# Patient Record
Sex: FEMALE | Race: BLACK OR AFRICAN AMERICAN | NOT HISPANIC OR LATINO | Employment: UNEMPLOYED | URBAN - METROPOLITAN AREA
[De-identification: names, ages, dates, MRNs, and addresses within clinical notes are randomized per-mention and may not be internally consistent; named-entity substitution may affect disease eponyms.]

---

## 2017-08-30 ENCOUNTER — CLINICAL SUPPORT (OUTPATIENT)
Dept: OCCUPATIONAL MEDICINE | Facility: CLINIC | Age: 32
End: 2017-08-30

## 2017-08-30 DIAGNOSIS — Z02.83 ENCOUNTER FOR DRUG SCREENING: Primary | ICD-10-CM

## 2017-08-30 LAB
CTP QC/QA: YES
POC 10 PANEL DRUG SCREEN: NEGATIVE

## 2017-08-30 PROCEDURE — 80305 DRUG TEST PRSMV DIR OPT OBS: CPT | Mod: QW,S$GLB,, | Performed by: EMERGENCY MEDICINE

## 2018-01-03 ENCOUNTER — HOSPITAL ENCOUNTER (EMERGENCY)
Facility: HOSPITAL | Age: 33
Discharge: HOME | End: 2018-01-03
Attending: EMERGENCY MEDICINE

## 2018-01-03 VITALS
RESPIRATION RATE: 16 BRPM | SYSTOLIC BLOOD PRESSURE: 110 MMHG | TEMPERATURE: 98 F | WEIGHT: 151 LBS | HEART RATE: 72 BPM | HEIGHT: 62 IN | OXYGEN SATURATION: 99 % | DIASTOLIC BLOOD PRESSURE: 67 MMHG | BODY MASS INDEX: 27.79 KG/M2

## 2018-01-03 DIAGNOSIS — Z34.90 EARLY STAGE OF PREGNANCY: Primary | ICD-10-CM

## 2018-01-03 PROCEDURE — 99283 EMERGENCY DEPT VISIT LOW MDM: CPT

## 2018-01-03 NOTE — ED TRIAGE NOTES
Patient states she is here for pregnancy test, states she is concerned about ectopic pregnancy. No abdominal pain spotting on Jan 1 . LMP 12/2

## 2018-01-03 NOTE — ED PROVIDER NOTES
"Encounter Date: 1/3/2018    SCRIBE #1 NOTE: I, Ronit Neal, am scribing for, and in the presence of,  Dr. Chino. I have scribed the entire note.       History     Chief Complaint   Patient presents with    Possible Pregnancy     Pt states she had a (+) UPT today at home.  States "I have been told that my tubes are blocked though and want to check if its in the tubes".      Time patient was seen by the provider: 1:51 PM      The patient is a 32 y.o. female with no PM hx who presents to the ED to be evaluated for a possible pregnancy. Pt had a positive UPT at home today; she thinks she may be approximately one month pregnant, however a recent HSG revealed a blockage in her fallopian tubes. She was told that she cannot get pregnant because of this and is concerned for ectopic/tubal pregnancy. LNMP was about a month ago. She endorses nausea, but denies abdominal pain. Pt has an appointment with her OB January 10th.         The history is provided by the patient and medical records.     Review of patient's allergies indicates:   Allergen Reactions    Iodine and iodide containing products     Latex, natural rubber      History reviewed. No pertinent past medical history.  History reviewed. No pertinent surgical history.  History reviewed. No pertinent family history.  Social History   Substance Use Topics    Smoking status: Never Smoker    Smokeless tobacco: Never Used    Alcohol use No     Review of Systems   Constitutional: Negative for fever.   HENT: Negative for sore throat.    Eyes: Negative for visual disturbance.   Respiratory: Negative for shortness of breath.    Cardiovascular: Negative for chest pain.   Gastrointestinal: Positive for nausea. Negative for abdominal pain.   Genitourinary: Negative for dysuria.   Musculoskeletal: Negative for back pain.   Skin: Negative for rash.   Neurological: Negative for weakness.   Hematological: Does not bruise/bleed easily.   Psychiatric/Behavioral: Negative for " suicidal ideas.   All other systems reviewed and are negative.      Physical Exam     Initial Vitals [01/03/18 1254]   BP Pulse Resp Temp SpO2   110/67 72 16 98.1 °F (36.7 °C) 99 %      MAP       81.33         Physical Exam    Nursing note and vitals reviewed.  Constitutional: She appears well-developed. She is not diaphoretic. No distress.   HENT:   Head: Normocephalic and atraumatic.   Mouth/Throat: Oropharynx is clear and moist.   Neck: Normal range of motion. Neck supple. No JVD present.   Cardiovascular: Normal rate, regular rhythm, normal heart sounds and intact distal pulses.   Pulmonary/Chest: Breath sounds normal. No respiratory distress. She has no wheezes. She has no rhonchi. She has no rales.   Abdominal: Soft. She exhibits no distension. There is no tenderness.   Musculoskeletal: Normal range of motion. She exhibits no edema.   Lymphadenopathy:     She has no cervical adenopathy.   Neurological: She is alert and oriented to person, place, and time. No cranial nerve deficit or sensory deficit.   Skin: Skin is warm and dry. Capillary refill takes less than 2 seconds.   Psychiatric: She has a normal mood and affect. Thought content normal.         ED Course   Procedures  Labs Reviewed - No data to display     Urine pregnancy test is Positive.     Medical Decision Making:   History:   Old Medical Records: I decided to obtain old medical records.  Initial Assessment:   Pt has no abd pain however it is too early in the pregnancy to see anything by US . I will send her back to her OB to have an appointment on January 10th.   Differential Diagnosis:   Early pregnancy             Scribe Attestation:   Scribe #1: I performed the above scribed service and the documentation accurately describes the services I performed. I attest to the accuracy of the note.    Attending Attestation:           Physician Attestation for Scribe:      Comments: I, Dr. Chino, personally performed the services described in this  documentation. All medical record entries made by the scribe were at my direction and in my presence.  I have reviewed the chart and agree that the record reflects my personal performance and is accurate and complete.              ED Course      Clinical Impression:   The encounter diagnosis was Early stage of pregnancy.    Disposition:   Disposition: Discharged  Condition: Stable                        Sonia Chino MD  01/03/18 8140

## 2018-01-03 NOTE — ED NOTES
Patient identifiers verified and correct for Ms Christopher  C/C: Pregnancy test  APPEARANCE: awake and alert in NAD.  SKIN: warm, dry and intact. No breakdown or bruising.  MUSCULOSKELETAL: Patient moving all extremities spontaneously, no obvious swelling or deformities noted. Ambulates independently.  RESPIRATORY: Denies shortness of breath.Respirations unlabored.   CARDIAC: Denies CP, 2+ distal pulses; no peripheral edema  ABDOMEN: S/ND/NT, Denies nausea Denies abd pain, positive nausea.   : voids spontaneously, denies difficulty  Neurologic: AAO x 4; follows commands equal strength in all extremities; denies numbness/tingling. Denies dizziness

## 2018-01-03 NOTE — ED NOTES
Discharge home, states understanding to follow up as directed. Ambulates out of ED without difficulty.

## 2019-11-30 ENCOUNTER — HOSPITAL ENCOUNTER (EMERGENCY)
Facility: HOSPITAL | Age: 34
Discharge: HOME | End: 2019-11-30
Attending: EMERGENCY MEDICINE
Payer: MEDICAID

## 2019-11-30 VITALS
BODY MASS INDEX: 28.71 KG/M2 | OXYGEN SATURATION: 100 % | TEMPERATURE: 98 F | WEIGHT: 156 LBS | HEIGHT: 62 IN | RESPIRATION RATE: 16 BRPM | SYSTOLIC BLOOD PRESSURE: 90 MMHG | DIASTOLIC BLOOD PRESSURE: 50 MMHG | HEART RATE: 77 BPM

## 2019-11-30 DIAGNOSIS — I95.1 ORTHOSTATIC HYPOTENSION: ICD-10-CM

## 2019-11-30 DIAGNOSIS — E83.51 HYPOCALCEMIA: ICD-10-CM

## 2019-11-30 DIAGNOSIS — R55 SYNCOPE: ICD-10-CM

## 2019-11-30 DIAGNOSIS — R55 SYNCOPE, UNSPECIFIED SYNCOPE TYPE: Primary | ICD-10-CM

## 2019-11-30 LAB
ANION GAP SERPL CALC-SCNC: 6 MMOL/L (ref 8–16)
B-HCG UR QL: NEGATIVE
BASOPHILS # BLD AUTO: 0.03 K/UL (ref 0–0.2)
BASOPHILS NFR BLD: 0.7 % (ref 0–1.9)
BUN SERPL-MCNC: 5 MG/DL (ref 6–20)
CALCIUM SERPL-MCNC: 7.4 MG/DL (ref 8.7–10.5)
CHLORIDE SERPL-SCNC: 110 MMOL/L (ref 95–110)
CO2 SERPL-SCNC: 24 MMOL/L (ref 23–29)
CREAT SERPL-MCNC: 0.6 MG/DL (ref 0.5–1.4)
CTP QC/QA: YES
DIFFERENTIAL METHOD: ABNORMAL
EOSINOPHIL # BLD AUTO: 0.1 K/UL (ref 0–0.5)
EOSINOPHIL NFR BLD: 1.1 % (ref 0–8)
ERYTHROCYTE [DISTWIDTH] IN BLOOD BY AUTOMATED COUNT: 13.2 % (ref 11.5–14.5)
EST. GFR  (AFRICAN AMERICAN): >60 ML/MIN/1.73 M^2
EST. GFR  (NON AFRICAN AMERICAN): >60 ML/MIN/1.73 M^2
GLUCOSE SERPL-MCNC: 100 MG/DL (ref 70–110)
HCT VFR BLD AUTO: 34.5 % (ref 37–48.5)
HGB BLD-MCNC: 10.5 G/DL (ref 12–16)
IMM GRANULOCYTES # BLD AUTO: 0.01 K/UL (ref 0–0.04)
IMM GRANULOCYTES NFR BLD AUTO: 0.2 % (ref 0–0.5)
LYMPHOCYTES # BLD AUTO: 1.2 K/UL (ref 1–4.8)
LYMPHOCYTES NFR BLD: 25.3 % (ref 18–48)
MCH RBC QN AUTO: 26.9 PG (ref 27–31)
MCHC RBC AUTO-ENTMCNC: 30.4 G/DL (ref 32–36)
MCV RBC AUTO: 89 FL (ref 82–98)
MONOCYTES # BLD AUTO: 0.2 K/UL (ref 0.3–1)
MONOCYTES NFR BLD: 4.6 % (ref 4–15)
NEUTROPHILS # BLD AUTO: 3.1 K/UL (ref 1.8–7.7)
NEUTROPHILS NFR BLD: 68.1 % (ref 38–73)
NRBC BLD-RTO: 0 /100 WBC
PLATELET # BLD AUTO: 238 K/UL (ref 150–350)
PMV BLD AUTO: 10.6 FL (ref 9.2–12.9)
POTASSIUM SERPL-SCNC: 3.4 MMOL/L (ref 3.5–5.1)
RBC # BLD AUTO: 3.9 M/UL (ref 4–5.4)
SODIUM SERPL-SCNC: 140 MMOL/L (ref 136–145)
WBC # BLD AUTO: 4.58 K/UL (ref 3.9–12.7)

## 2019-11-30 PROCEDURE — 25000003 PHARM REV CODE 250: Performed by: EMERGENCY MEDICINE

## 2019-11-30 PROCEDURE — 93010 ELECTROCARDIOGRAM REPORT: CPT | Mod: ,,, | Performed by: INTERNAL MEDICINE

## 2019-11-30 PROCEDURE — 63600175 PHARM REV CODE 636 W HCPCS: Performed by: EMERGENCY MEDICINE

## 2019-11-30 PROCEDURE — 80048 BASIC METABOLIC PNL TOTAL CA: CPT

## 2019-11-30 PROCEDURE — 85025 COMPLETE CBC W/AUTO DIFF WBC: CPT

## 2019-11-30 PROCEDURE — 93010 EKG 12-LEAD: ICD-10-PCS | Mod: ,,, | Performed by: INTERNAL MEDICINE

## 2019-11-30 PROCEDURE — 96360 HYDRATION IV INFUSION INIT: CPT

## 2019-11-30 PROCEDURE — 81025 URINE PREGNANCY TEST: CPT | Performed by: EMERGENCY MEDICINE

## 2019-11-30 PROCEDURE — 93005 ELECTROCARDIOGRAM TRACING: CPT

## 2019-11-30 PROCEDURE — 99284 EMERGENCY DEPT VISIT MOD MDM: CPT | Mod: 25

## 2019-11-30 RX ORDER — ACETAMINOPHEN 500 MG
1000 TABLET ORAL
Status: COMPLETED | OUTPATIENT
Start: 2019-11-30 | End: 2019-11-30

## 2019-11-30 RX ADMIN — SODIUM CHLORIDE 1000 ML: 0.9 INJECTION, SOLUTION INTRAVENOUS at 01:11

## 2019-11-30 RX ADMIN — ACETAMINOPHEN 1000 MG: 500 TABLET ORAL at 01:11

## 2019-11-30 NOTE — ED PROVIDER NOTES
"Encounter Date: 2019    SCRIBE #1 NOTE: I, Elena Webb, am scribing for, and in the presence of,  Leena Chapa MD. I have scribed the following portions of the note - Other sections scribed: HPI, ROS.       History     Chief Complaint   Patient presents with    Pre Syncope     EMS reports pt donated plasma this morning and had a syncopal episode at the grocery store. reports this is the second time she has donated in two weeks. inital bp was 76/p.      CC: Pre Syncope    HPI:  This is a 34 y.o. female with no pertinent PMHx who presents to the Emergency Department with a cc of syncope occurring this morning. Patient reports associated abdominal pain she describes as cramping, and "mild" headache. Denies chest pain, shortness of breath, dysuria, frequency, or urgency. Patient donated plasma this morning and states that this is her second time donating in the past two weeks. Once she left the donation center, she visited the grocery store which is where she had her syncopal episode. Before the patient could hit the ground she was caught, denies injury or hitting her head. Patient denies eating breaking prior to giving blood.  EMS noted a blood pressure of 70/palpable, she was started on IV fludis (1L NS currently infusing) and currently reports she is feeling improved. No recent illness. Allergic to iodine and latex.           The history is provided by the patient.     Review of patient's allergies indicates:   Allergen Reactions    Iodine and iodide containing products     Latex, natural rubber      History reviewed. No pertinent past medical history.  Past Surgical History:   Procedure Laterality Date     SECTION       History reviewed. No pertinent family history.  Social History     Tobacco Use    Smoking status: Never Smoker    Smokeless tobacco: Never Used   Substance Use Topics    Alcohol use: No    Drug use: No     Review of Systems   Constitutional: Negative for fever.   HENT: " Negative for sore throat.    Respiratory: Negative for shortness of breath.    Cardiovascular: Negative for chest pain.   Gastrointestinal: Positive for abdominal pain (cramping). Negative for nausea.   Genitourinary: Negative for dysuria, frequency and urgency.   Musculoskeletal: Negative for back pain.   Skin: Negative for rash.   Neurological: Positive for syncope and headaches. Negative for weakness.   Hematological: Does not bruise/bleed easily.       Physical Exam     Initial Vitals [11/30/19 1226]   BP Pulse Resp Temp SpO2   (!) 92/57 75 15 98.4 °F (36.9 °C) 100 %      MAP       --         Physical Exam    Constitutional: She appears well-developed and well-nourished. She is not diaphoretic. No distress.   HENT:   Head: Normocephalic and atraumatic.   Mouth/Throat: Oropharynx is clear and moist.   Eyes: Conjunctivae and EOM are normal. Pupils are equal, round, and reactive to light.   Neck: Neck supple.   Cardiovascular: Normal rate, regular rhythm and intact distal pulses.   Pulmonary/Chest: Breath sounds normal.   Abdominal: Soft. There is no tenderness.   Musculoskeletal: She exhibits no edema.   Neurological: She is alert and oriented to person, place, and time. She has normal strength. No cranial nerve deficit or sensory deficit. GCS score is 15. GCS eye subscore is 4. GCS verbal subscore is 5. GCS motor subscore is 6.   Skin: Skin is warm and dry.   Psychiatric: She has a normal mood and affect.         ED Course   Procedures  Labs Reviewed   CBC W/ AUTO DIFFERENTIAL - Abnormal; Notable for the following components:       Result Value    RBC 3.90 (*)     Hemoglobin 10.5 (*)     Hematocrit 34.5 (*)     Mean Corpuscular Hemoglobin 26.9 (*)     Mean Corpuscular Hemoglobin Conc 30.4 (*)     Mono # 0.2 (*)     All other components within normal limits   BASIC METABOLIC PANEL - Abnormal; Notable for the following components:    Potassium 3.4 (*)     BUN, Bld 5 (*)     Calcium 7.4 (*)     Anion Gap 6 (*)      All other components within normal limits   POCT URINE PREGNANCY        ECG Results          EKG 12-lead (Preliminary result)  Result time 11/30/19 12:55:04    ED Interpretation by Leena Chapa MD (11/30/19 12:55:04)    Normal sinus rhythm, rate 70 beats per minute, no STEMI, no malignant dysrhythmia, .                            Imaging Results    None          Medical Decision Making:   Initial Assessment:   34-year-old female with no past medical history presents after syncopal episode.  She donated plasma for the 2nd time this morning, did not eat or drink prior to donation.  She denies injury from syncopal episode.  Reports mild headache. Nonfocal neuro exam, overall patient appears well. She was given 1 L of normal saline with EMS.  In the emergency department, basic labs reviewed showing mild anemia, hypocalcemia.  Anemia actually appears to be improved compared to last reading, I suspect hypocalcemia due to recent plasma donation.  No sign of dysrhythmia or STEMI on EKG.  She was given a 2nd L of normal saline in the emergency department.  Her headache has resolved.  She is able to stand up and ambulate without presyncopal symptoms.  She feels comfortable with discharge home.  Advised to follow up with primary care physician in 1-2 weeks to recheck calcium level, return to ER if needed for any new or worsening symptoms.            Scribe Attestation:   Scribe #1: I performed the above scribed service and the documentation accurately describes the services I performed. I attest to the accuracy of the note.                              Clinical Impression:       ICD-10-CM ICD-9-CM   1. Syncope, unspecified syncope type R55 780.2   2. Syncope R55 780.2   3. Orthostatic hypotension I95.1 458.0            Scribe attestation: I, Leena Chapa MD, personally performed the services described in this documentation. All medical record entries made by the scribe were at my direction and in my presence. I  have reviewed the chart and agree that the record reflects my personal performance and is accurate and complete                 Leena Chapa MD  11/30/19 5383

## 2019-11-30 NOTE — ED TRIAGE NOTES
Pt reports to ED via EMS with s/p fainting after donating plasma at 1000 this AM. Pt reports not having eaten beforehand. Pt reports fainting inside a store, reports someone caught her. Pt denies any pain at this time, denies hitting her head. Pt denies N/V/D, dizziness. Pt reports 5/10 HA.     Pt is AAOx4, able to verbalize and follow commands.

## 2019-12-16 ENCOUNTER — HOSPITAL ENCOUNTER (EMERGENCY)
Facility: HOSPITAL | Age: 34
Discharge: HOME | End: 2019-12-16
Attending: EMERGENCY MEDICINE
Payer: MEDICAID

## 2019-12-16 VITALS
SYSTOLIC BLOOD PRESSURE: 107 MMHG | WEIGHT: 160 LBS | BODY MASS INDEX: 29.44 KG/M2 | RESPIRATION RATE: 18 BRPM | HEART RATE: 84 BPM | TEMPERATURE: 98 F | DIASTOLIC BLOOD PRESSURE: 63 MMHG | HEIGHT: 62 IN | OXYGEN SATURATION: 100 %

## 2019-12-16 DIAGNOSIS — K08.89 DENTALGIA: Primary | ICD-10-CM

## 2019-12-16 DIAGNOSIS — H92.02 OTALGIA OF LEFT EAR: ICD-10-CM

## 2019-12-16 PROCEDURE — 99284 PR EMERGENCY DEPT VISIT,LEVEL IV: ICD-10-PCS | Mod: ,,, | Performed by: PHYSICIAN ASSISTANT

## 2019-12-16 PROCEDURE — 99284 EMERGENCY DEPT VISIT MOD MDM: CPT | Mod: ,,, | Performed by: PHYSICIAN ASSISTANT

## 2019-12-16 PROCEDURE — 25000003 PHARM REV CODE 250: Performed by: PHYSICIAN ASSISTANT

## 2019-12-16 PROCEDURE — 99283 EMERGENCY DEPT VISIT LOW MDM: CPT

## 2019-12-16 RX ORDER — CLINDAMYCIN HYDROCHLORIDE 150 MG/1
450 CAPSULE ORAL 3 TIMES DAILY
Qty: 63 CAPSULE | Refills: 0 | Status: SHIPPED | OUTPATIENT
Start: 2019-12-16 | End: 2019-12-23

## 2019-12-16 RX ORDER — ACETAMINOPHEN 500 MG
1000 TABLET ORAL
Status: DISCONTINUED | OUTPATIENT
Start: 2019-12-16 | End: 2019-12-16 | Stop reason: HOSPADM

## 2019-12-16 RX ORDER — CLINDAMYCIN HYDROCHLORIDE 150 MG/1
450 CAPSULE ORAL
Status: COMPLETED | OUTPATIENT
Start: 2019-12-16 | End: 2019-12-16

## 2019-12-16 RX ADMIN — CLINDAMYCIN HYDROCHLORIDE 450 MG: 150 CAPSULE ORAL at 04:12

## 2019-12-16 NOTE — ED TRIAGE NOTES
Pt reports left ear pain for the past 3 weeks. Pt reports jaw  and tooth pain when opening mouth. Pt is aox4.

## 2019-12-16 NOTE — DISCHARGE INSTRUCTIONS
Take the prescribed Clindamycin for treatment of your dental infection. Follow-up with dentistry for further management. Use the below resources to obtain an appointment.     You may take Tylenol and Ibuprofen as directed for further management of your pain.     DENTAL RESOURCES      [facility]       [phone]     [facility] 8-4 Monday - Friday       [phone]     [facility]       [phone]     [facility]                                 0-6 years                                                                                             7-13 years     [phone]     [facility]    [facility]     [phone]     [facility]       [phone]     [facility]    [address], Mon-Fri not on Wed  8am-4pm    Over 60 years old living in [place]           [phone]     [facility]    [address] [place]     [phone]     [facility] [place]       [phone]     [facility]  [address] [place]       [phone]     [facility]       [phone]

## 2019-12-16 NOTE — ED PROVIDER NOTES
Encounter Date: 2019       History     Chief Complaint   Patient presents with    Otalgia     L ear pain for month     34 year old female previously healthy presenting to the ED with the chief complaint of left ear pain. Patient reports 3 weeks of left ear pain. She reports worsening pain with opening her jaw and worries that a dental cavity maybe causing her pain. She reports poor dental hygiene with multiple broken teeth. She has tried IBU for pain with moderate relief. She denies otorrhea, hearing loss, tinnitus, trauma. She denies fever, headache, sinus congestion, rhinorrea, sore throat, neck stiffness, chest pain, SOB, cough, abdominal pain, nausea, vomiting, urinary or bowel movement changes.         Review of patient's allergies indicates:   Allergen Reactions    Iodine and iodide containing products     Latex, natural rubber      History reviewed. No pertinent past medical history.  Past Surgical History:   Procedure Laterality Date     SECTION       No family history on file.  Social History     Tobacco Use    Smoking status: Never Smoker    Smokeless tobacco: Never Used   Substance Use Topics    Alcohol use: No    Drug use: No     Review of Systems   Constitutional: Negative for chills, diaphoresis and fever.   HENT: Positive for dental problem and ear pain. Negative for sore throat and trouble swallowing.    Eyes: Negative for pain and redness.   Respiratory: Negative for cough and shortness of breath.    Cardiovascular: Negative for chest pain.   Gastrointestinal: Negative for abdominal pain, constipation, diarrhea, nausea and vomiting.   Genitourinary: Negative for dysuria and flank pain.   Musculoskeletal: Negative for arthralgias, back pain, neck pain and neck stiffness.   Skin: Negative for rash and wound.   Neurological: Negative for light-headedness and headaches.       Physical Exam     Initial Vitals [19 1618]   BP Pulse Resp Temp SpO2   (!) 100/50 81 18 98.4 °F (36.9  °C) 99 %      MAP       --         Physical Exam    Constitutional: She appears well-developed and well-nourished. She is not diaphoretic. No distress.   HENT:   Head: Normocephalic and atraumatic.   Right Ear: Tympanic membrane and ear canal normal. No mastoid tenderness. Tympanic membrane is not injected and not erythematous. No hemotympanum. No decreased hearing is noted.   Left Ear: Tympanic membrane and ear canal normal. No mastoid tenderness. Tympanic membrane is not injected and not erythematous. No hemotympanum. No decreased hearing is noted.   Mouth/Throat: Oropharynx is clear and moist. No oropharyngeal exudate.   Multiple missing and broken teeth. TTP left lower 1st pre-molar. No gingival edema or abscess. No submandibular edema.    Eyes: EOM are normal. Pupils are equal, round, and reactive to light.   Neck: Normal range of motion. Neck supple.   Cardiovascular: Normal rate and regular rhythm.   Pulmonary/Chest: Breath sounds normal. No respiratory distress. She has no wheezes.   Abdominal: Soft. There is no tenderness.   Musculoskeletal: Normal range of motion. She exhibits no tenderness.   Neurological: She is alert and oriented to person, place, and time. She has normal strength. No cranial nerve deficit or sensory deficit.   Skin: Skin is warm. No erythema.         ED Course   Procedures  Labs Reviewed   POCT URINE PREGNANCY          Imaging Results    None          Medical Decision Making:   History:   Old Medical Records: I decided to obtain old medical records.  Old Records Summarized: records from clinic visits.       APC / Resident Notes:   34 year old female previously healthy presenting to the ED c/o left ear pain and dentalgia over left lower molar pain. DDx includes but not otitis media, otitis externa, viral syndrome, dental abscess, gingivitis, sialadenitis, parotitis. I have considered but do not suspect Reza's angina, mastoiditis, facial cellulitis.     Ear exam benign without evidence  of infection. Multiple broken teeth with tender L lower molar on exam. No gingiva swelling or abscess present. Will treat for underlying dental infection. Patient non-toxic appearing and ambulates around the ED without difficulty. RX for Clindamycin provided with first dose given in the ED. Advised outpatient follow-up with dentistry and PCP. Patient expresses understanding and agreeable to the plan. Return to ED precautions given for new, worsening, or concerning symptoms.                            Clinical Impression:       ICD-10-CM ICD-9-CM   1. Dentalgia K08.89 525.9   2. Otalgia of left ear H92.02 388.70         Disposition:   Disposition: Discharged  Condition: Stable                     Oswald Peralta PA-C  12/16/19 2051

## 2021-09-13 ENCOUNTER — HOSPITAL ENCOUNTER (EMERGENCY)
Facility: HOSPITAL | Age: 36
Discharge: HOME | End: 2021-09-13
Attending: EMERGENCY MEDICINE
Payer: MEDICAID

## 2021-09-13 VITALS
HEIGHT: 61 IN | TEMPERATURE: 99 F | SYSTOLIC BLOOD PRESSURE: 110 MMHG | DIASTOLIC BLOOD PRESSURE: 63 MMHG | RESPIRATION RATE: 18 BRPM | BODY MASS INDEX: 30.21 KG/M2 | WEIGHT: 160 LBS | OXYGEN SATURATION: 98 % | HEART RATE: 91 BPM

## 2021-09-13 DIAGNOSIS — Z20.822 ENCOUNTER FOR LABORATORY TESTING FOR COVID-19 VIRUS: Primary | ICD-10-CM

## 2021-09-13 DIAGNOSIS — Z20.822 COVID-19 VIRUS NOT DETECTED: ICD-10-CM

## 2021-09-13 LAB
CTP QC/QA: YES
SARS-COV-2 RDRP RESP QL NAA+PROBE: NEGATIVE

## 2021-09-13 PROCEDURE — 99282 EMERGENCY DEPT VISIT SF MDM: CPT | Mod: 25

## 2021-09-13 PROCEDURE — 99282 EMERGENCY DEPT VISIT SF MDM: CPT | Mod: CS,,, | Performed by: EMERGENCY MEDICINE

## 2021-09-13 PROCEDURE — 99282 PR EMERGENCY DEPT VISIT,LEVEL II: ICD-10-PCS | Mod: CS,,, | Performed by: EMERGENCY MEDICINE

## 2021-09-13 PROCEDURE — U0002 COVID-19 LAB TEST NON-CDC: HCPCS | Performed by: EMERGENCY MEDICINE

## 2023-07-12 ENCOUNTER — HOSPITAL ENCOUNTER (EMERGENCY)
Facility: OTHER | Age: 38
Discharge: HOME | End: 2023-07-12
Attending: EMERGENCY MEDICINE
Payer: MEDICAID

## 2023-07-12 VITALS
HEART RATE: 75 BPM | SYSTOLIC BLOOD PRESSURE: 104 MMHG | OXYGEN SATURATION: 100 % | HEIGHT: 61 IN | RESPIRATION RATE: 18 BRPM | WEIGHT: 142 LBS | BODY MASS INDEX: 26.81 KG/M2 | DIASTOLIC BLOOD PRESSURE: 57 MMHG | TEMPERATURE: 98 F

## 2023-07-12 DIAGNOSIS — R79.89 ABNORMAL LFTS: ICD-10-CM

## 2023-07-12 DIAGNOSIS — R10.30 LOWER ABDOMINAL PAIN: Primary | ICD-10-CM

## 2023-07-12 DIAGNOSIS — R07.9 CHEST PAIN: ICD-10-CM

## 2023-07-12 LAB
ALBUMIN SERPL BCP-MCNC: 3.7 G/DL (ref 3.5–5.2)
ALP SERPL-CCNC: 48 U/L (ref 55–135)
ALT SERPL W/O P-5'-P-CCNC: 226 U/L (ref 10–44)
ANION GAP SERPL CALC-SCNC: 10 MMOL/L (ref 8–16)
AST SERPL-CCNC: 446 U/L (ref 10–40)
B-HCG UR QL: NEGATIVE
BASOPHILS # BLD AUTO: 0.02 K/UL (ref 0–0.2)
BASOPHILS NFR BLD: 0.3 % (ref 0–1.9)
BILIRUB SERPL-MCNC: 0.3 MG/DL (ref 0.1–1)
BILIRUB UR QL STRIP: NEGATIVE
BUN SERPL-MCNC: 8 MG/DL (ref 6–20)
CALCIUM SERPL-MCNC: 8.7 MG/DL (ref 8.7–10.5)
CHLORIDE SERPL-SCNC: 105 MMOL/L (ref 95–110)
CLARITY UR: CLEAR
CO2 SERPL-SCNC: 23 MMOL/L (ref 23–29)
COLOR UR: YELLOW
CREAT SERPL-MCNC: 0.7 MG/DL (ref 0.5–1.4)
CTP QC/QA: YES
DIFFERENTIAL METHOD: ABNORMAL
EOSINOPHIL # BLD AUTO: 0 K/UL (ref 0–0.5)
EOSINOPHIL NFR BLD: 0.3 % (ref 0–8)
ERYTHROCYTE [DISTWIDTH] IN BLOOD BY AUTOMATED COUNT: 13.6 % (ref 11.5–14.5)
EST. GFR  (NO RACE VARIABLE): >60 ML/MIN/1.73 M^2
GLUCOSE SERPL-MCNC: 106 MG/DL (ref 70–110)
GLUCOSE UR QL STRIP: NEGATIVE
HCT VFR BLD AUTO: 34.9 % (ref 37–48.5)
HGB BLD-MCNC: 10.9 G/DL (ref 12–16)
HGB UR QL STRIP: NEGATIVE
IMM GRANULOCYTES # BLD AUTO: 0.01 K/UL (ref 0–0.04)
IMM GRANULOCYTES NFR BLD AUTO: 0.2 % (ref 0–0.5)
KETONES UR QL STRIP: NEGATIVE
LEUKOCYTE ESTERASE UR QL STRIP: NEGATIVE
LYMPHOCYTES # BLD AUTO: 0.5 K/UL (ref 1–4.8)
LYMPHOCYTES NFR BLD: 9.2 % (ref 18–48)
MCH RBC QN AUTO: 28.4 PG (ref 27–31)
MCHC RBC AUTO-ENTMCNC: 31.2 G/DL (ref 32–36)
MCV RBC AUTO: 91 FL (ref 82–98)
MONOCYTES # BLD AUTO: 0.2 K/UL (ref 0.3–1)
MONOCYTES NFR BLD: 3.3 % (ref 4–15)
NEUTROPHILS # BLD AUTO: 5.1 K/UL (ref 1.8–7.7)
NEUTROPHILS NFR BLD: 86.7 % (ref 38–73)
NITRITE UR QL STRIP: NEGATIVE
NRBC BLD-RTO: 0 /100 WBC
PH UR STRIP: 7 [PH] (ref 5–8)
PLATELET # BLD AUTO: 247 K/UL (ref 150–450)
PMV BLD AUTO: 10.1 FL (ref 9.2–12.9)
POTASSIUM SERPL-SCNC: 3.7 MMOL/L (ref 3.5–5.1)
PROT SERPL-MCNC: 6.9 G/DL (ref 6–8.4)
PROT UR QL STRIP: NEGATIVE
RBC # BLD AUTO: 3.84 M/UL (ref 4–5.4)
SODIUM SERPL-SCNC: 138 MMOL/L (ref 136–145)
SP GR UR STRIP: 1.01 (ref 1–1.03)
TROPONIN I SERPL DL<=0.01 NG/ML-MCNC: <0.006 NG/ML (ref 0–0.03)
URN SPEC COLLECT METH UR: NORMAL
UROBILINOGEN UR STRIP-ACNC: NEGATIVE EU/DL
WBC # BLD AUTO: 5.84 K/UL (ref 3.9–12.7)

## 2023-07-12 PROCEDURE — 85025 COMPLETE CBC W/AUTO DIFF WBC: CPT | Performed by: PHYSICIAN ASSISTANT

## 2023-07-12 PROCEDURE — 25000003 PHARM REV CODE 250: Performed by: EMERGENCY MEDICINE

## 2023-07-12 PROCEDURE — 99285 EMERGENCY DEPT VISIT HI MDM: CPT | Mod: 25

## 2023-07-12 PROCEDURE — 81003 URINALYSIS AUTO W/O SCOPE: CPT | Performed by: EMERGENCY MEDICINE

## 2023-07-12 PROCEDURE — 80053 COMPREHEN METABOLIC PANEL: CPT | Performed by: PHYSICIAN ASSISTANT

## 2023-07-12 PROCEDURE — 93005 ELECTROCARDIOGRAM TRACING: CPT

## 2023-07-12 PROCEDURE — 81025 URINE PREGNANCY TEST: CPT | Performed by: EMERGENCY MEDICINE

## 2023-07-12 PROCEDURE — 84484 ASSAY OF TROPONIN QUANT: CPT | Performed by: PHYSICIAN ASSISTANT

## 2023-07-12 PROCEDURE — 93010 EKG 12-LEAD: ICD-10-PCS | Mod: ,,, | Performed by: INTERNAL MEDICINE

## 2023-07-12 PROCEDURE — 93010 ELECTROCARDIOGRAM REPORT: CPT | Mod: ,,, | Performed by: INTERNAL MEDICINE

## 2023-07-12 RX ORDER — ASPIRIN 325 MG
325 TABLET ORAL
Status: COMPLETED | OUTPATIENT
Start: 2023-07-12 | End: 2023-07-12

## 2023-07-12 RX ORDER — MAG HYDROX/ALUMINUM HYD/SIMETH 200-200-20
15 SUSPENSION, ORAL (FINAL DOSE FORM) ORAL ONCE
Status: COMPLETED | OUTPATIENT
Start: 2023-07-12 | End: 2023-07-12

## 2023-07-12 RX ADMIN — ASPIRIN 325 MG ORAL TABLET 325 MG: 325 PILL ORAL at 12:07

## 2023-07-12 RX ADMIN — ALUMINUM HYDROXIDE, MAGNESIUM HYDROXIDE, AND SIMETHICONE 15 ML: 200; 200; 20 SUSPENSION ORAL at 12:07

## 2023-07-12 NOTE — ED TRIAGE NOTES
Patient presents to ED with c/o mid sternal chest pain radiating to back and L arm with n/v and SOBstarting this morning. Denies fever or cough.

## 2023-07-12 NOTE — Clinical Note
"Jane"Rishabh Christopher was seen and treated in our emergency department on 7/12/2023.  She may return to work on 07/14/2023.       If you have any questions or concerns, please don't hesitate to call.      Anderson Anders MD"

## 2023-07-12 NOTE — FIRST PROVIDER EVALUATION
Emergency Department TeleTriage Encounter Note      CHIEF COMPLAINT    Chief Complaint   Patient presents with    Chest Pain     Reports CP and SOB that started this morning. Endorses bilateral arm pain and nausea. Took Tums with no relief.       VITAL SIGNS   Initial Vitals [07/12/23 1052]   BP Pulse Resp Temp SpO2   (!) 119/56 110 20 97.8 °F (36.6 °C) 98 %      MAP       --            ALLERGIES    Review of patient's allergies indicates:   Allergen Reactions    Iodine and iodide containing products     Latex, natural rubber        PROVIDER TRIAGE NOTE  Patient presents with complaint of chest pain and shortness of breath that started this AM with associated nausea. No LE Swelling. No calf tenderness. Took tums with no improvement.       Phy:   Constitutional: well nourished, well developed, appearing stated age, NAD   HEENT: NCAT, symmetrical lids, No obvious facial deformity.  Normal phonation. Normal Conjunctiva   Neck: NAROM   Respiratory: Normal effort.  No obvious use of accessory muscles   Musculoskeletal: Moved upper extremities well   Neuro: Alert, answers questions appropriately    Psych: appropriate mood and affect      Initial orders will be placed and care will be transferred to an alternate provider when patient is roomed for a full evaluation. Any additional orders and the final disposition will be determined by that provider.        ORDERS  Labs Reviewed   CBC W/ AUTO DIFFERENTIAL   COMPREHENSIVE METABOLIC PANEL       ED Orders (720h ago, onward)      Start Ordered     Status Ordering Provider    07/12/23 1105 07/12/23 1105  Vital signs  Every 15 min         Ordered RANJANA COCHRAN    07/12/23 1105 07/12/23 1105  Cardiac Monitoring - Adult  Continuous        Comments: Notify Physician If:    Ordered RANJANA COCHRAN    07/12/23 1105 07/12/23 1105  Pulse Oximetry Continuous  Continuous         Ordered RANJANA COCHRAN    07/12/23 1105 07/12/23 1105  Diet NPO  Diet  effective now         Ordered ELIZABETHTO LYNSEY, RANJANA    07/12/23 1105 07/12/23 1105  Saline lock IV  Once         Ordered TEREZA MENON, RANJANA    07/12/23 1105 07/12/23 1105  EKG 12-lead  Once        Comments: Do not perform if previously done during this visit/ triage    Ordered ELIZABETHTO LYNSEY, RANJANA    07/12/23 1105 07/12/23 1105  CBC auto differential  STAT         Ordered TEREZA MENON, RANJANA    07/12/23 1105 07/12/23 1105  Comprehensive metabolic panel  STAT         Ordered NEGVLADTO LYNSEY, RANJANA    07/12/23 1105 07/12/23 1105  X-Ray Chest AP Portable  1 time imaging         Ordered RANJANA COCHRAN    07/12/23 1100 07/12/23 1059  EKG 12-lead  Once         Preliminary result JR RODRÍGUEZ    Unscheduled 07/12/23 1105  Troponin I  STAT         Ordered RANJANA COCHRAN              Virtual Visit Note: The provider triage portion of this emergency department evaluation and documentation was performed via [employer], a HIPAA-compliant telemedicine application, in concert with a tele-presenter in the room. A face to face patient evaluation with one of my colleagues will occur once the patient is placed in an emergency department room.      DISCLAIMER: This note was prepared with [employer] voice recognition transcription software. Garbled syntax, mangled pronouns, and other bizarre constructions may be attributed to that software system.

## 2023-07-12 NOTE — ED PROVIDER NOTES
Encounter Date: 2023    SCRIBE #1 NOTE: I, Gurdeep Harrell am scribing for, and in the presence of,  Anderson Anders MD. I have scribed the following portions of the note - Other sections scribed: HPI, ROS.     History     Chief Complaint   Patient presents with    Chest Pain     Reports CP and SOB that started this morning. Endorses bilateral arm pain and nausea. Took Tums with no relief.     Time seen by provider: 11:16 AM    This is a 37 y.o. female who presents with complaint of chest pain this morning. Patient recalls a recent history of left calf, ankle, and feet swelling at night. She awoke this morning to mid chest pain which she initially thought was gas pain. However, patient took Tums without relief. She has since developed pain to her arms and back as well as shortness of breath. Patient has never had these symptoms in the past. She denies any fever, chills, congestion, cough, vomiting, or diarrhea. Patient does not take any medications. She has never been seen by GI. PSHx of multiple c-sections. LMP was three days ago. No SHx of alcohol or tobacco use. This is the extent of the patient's complaints at this time.    The history is provided by the patient.   Review of patient's allergies indicates:   Allergen Reactions    Iodine and iodide containing products     Latex, natural rubber      History reviewed. No pertinent past medical history.  Past Surgical History:   Procedure Laterality Date     SECTION       History reviewed. No pertinent family history.  Social History     Tobacco Use    Smoking status: Never    Smokeless tobacco: Never   Substance Use Topics    Alcohol use: No    Drug use: No     Review of Systems  Constitutional-no fever  HEENT-no congestion  Eyes-no redness  Respiratory-notes shortness of breath  Cardio-notes chest pain  GI-no abdominal pain  Endocrine-no cold intolerance  -no difficulty urinating  MSK-notes back pain and myalgias  Skin-no rashes  Allergy-no  environmental allergy  Neurologic-, no headache  Hematology-no swollen nodes  Behavioral-no confusion    Physical Exam     Initial Vitals [07/12/23 1052]   BP Pulse Resp Temp SpO2   (!) 119/56 110 20 97.8 °F (36.6 °C) 98 %      MAP       --         Physical Exam  Constitutional: Well appearing, no distress.  Eyes: Conjunctivae normal.  ENT       Head: Normocephalic, atraumatic.       Nose: No congestion.       Mouth/Throat: Mucous membranes are moist.  Hematological/Lymphatic/Immunilogical: No cervical lymphadenopathy.  Cardiovascular: Normal rate, regular rhythm. Normal and symmetric distal pulses.  Respiratory: Normal respiratory effort. Breath sounds are normal.  Gastrointestinal: Soft, nontender.   Musculoskeletal: Normal range of motion in all extremities. No obvious deformities or swelling.  Neurologic: Alert, oriented. Normal speech and language. No gross focal neurologic deficits are appreciated.  Skin: Skin is warm, dry. No rash noted.  Psychiatric: Mood and affect are normal.     ED Course   Procedures  Labs Reviewed   CBC W/ AUTO DIFFERENTIAL - Abnormal; Notable for the following components:       Result Value    RBC 3.84 (*)     Hemoglobin 10.9 (*)     Hematocrit 34.9 (*)     MCHC 31.2 (*)     Lymph # 0.5 (*)     Mono # 0.2 (*)     Gran % 86.7 (*)     Lymph % 9.2 (*)     Mono % 3.3 (*)     All other components within normal limits   COMPREHENSIVE METABOLIC PANEL - Abnormal; Notable for the following components:    Alkaline Phosphatase 48 (*)      (*)      (*)     All other components within normal limits   TROPONIN I   URINALYSIS, REFLEX TO URINE CULTURE    Narrative:     Specimen Source->Urine   POCT URINE PREGNANCY        ECG Results              EKG 12-lead (Preliminary result)  Result time 07/12/23 11:01:01      Wet Read by Anderson Anders MD (07/12/23 11:01:01, [facility], Emergency Medicine)    My EKG interpretation, sinus rhythm, 90 beats per minute, normal axis,  no ST segment changes, when compared to previous EKG 11/30/2019 voltage of QRS as lower                                  Imaging Results              US Abdomen Limited (Final result)  Result time 07/12/23 13:43:40      Final result by August Lane III, MD (07/12/23 13:43:40)                   Impression:      Normal appearance of the gallbladder and biliary tree and incidental small simple cyst right kidney.      Electronically signed by: August Lane MD  Date:    07/12/2023  Time:    13:43               Narrative:    EXAMINATION:  US ABDOMEN LIMITED    CLINICAL HISTORY:  elevated lfts;    FINDINGS:  The pancreas demonstrates no abnormality.  The gallbladder demonstrates no stone, wall thickening, Allen sign and the bile duct measures 3 mm there is a right renal lower pole cyst measuring 0.8 cm.                                       X-Ray Chest AP Portable (Final result)  Result time 07/12/23 12:26:39      Final result by Oswald Staton MD (07/12/23 12:26:39)                   Impression:      No radiographic acute intrathoracic process seen on this single view      Electronically signed by: Oswald Staton MD  Date:    07/12/2023  Time:    12:26               Narrative:    EXAMINATION:  XR CHEST AP PORTABLE    CLINICAL HISTORY:  Chest Pain;    TECHNIQUE:  Single frontal view of the chest was performed.    COMPARISON:  None    FINDINGS:  Patient is slightly rotated.  Trachea is midline and patent.The lungs are symmetrically well expanded and clear, with normal appearance of pulmonary vasculature and no pleural effusion or pneumothorax.    The cardiac silhouette is normal in size. The hilar and mediastinal contours are unremarkable.    Bones are intact.  PA and lateral views can be obtained.                                       Medications   aspirin tablet 325 mg (325 mg Oral Given 7/12/23 1212)   aluminum-magnesium hydroxide-simethicone 200-200-20 mg/5 mL suspension 15 mL (15 mLs Oral Given 7/12/23 1213)      Medical Decision Making:   History:   Old Medical Records: I decided to obtain old medical records.  Old Records Summarized: records from clinic visits and records from previous admission(s).       <> Summary of Records: Previous episode syncope, pregnancies, COVID testing  Differential Diagnosis:   Angina, myocardial infarction, pneumonia, COVID, influenza, anemia, atypical chest pain, reflux  Independently Interpreted Test(s):   I have ordered and independently interpreted X-rays - see summary below.       <> Summary of X-Ray Reading(s): No focal infiltrate, no pneumothorax  I have ordered and independently interpreted EKG Reading(s) - see prior notes  Clinical Tests:   Lab Tests: Ordered and Reviewed  Radiological Study: Ordered and Reviewed  Medical Tests: Ordered and Reviewed  ED Management:  37-year-old with vague symptoms unresolved by Tums prior to arrival, potentially thoracic or abdominal and ideology.    LFTs mildly elevated, when compared to previous this is a change.    Labs otherwise unrevealing overall, EKG reassuring, x-ray nondiagnostic.    Obtain ultrasound imaging of the biliary tree for further evaluation of potential hepatic origin, unrevealing overall.    This woman is well-appearing overall, tolerating orals, her abdominal examination is benign.  Her urinalysis is unrevealing.  Believe she is likely stable for outpatient management of the symptoms low suspicion for more serious underlying etiology of her symptoms at this time.        Scribe Attestation:   Scribe #1: I performed the above scribed service and the documentation accurately describes the services I performed. I attest to the accuracy of the note.  Physician Attestation for Scribe: I, colin rice, reviewed documentation as scribed in my presence, which is both accurate and complete.                     Clinical Impression:   Final diagnoses:  [R07.9] Chest pain  [R10.30] Lower abdominal pain (Primary)  [R79.89] Abnormal  LFTs        ED Disposition Condition    Discharge Stable          ED Prescriptions    None       Follow-up Information       Follow up With Specialties Details Why Contact Info Additional Information    [facility] [address] G[facility] Schedule an appointment as soon as possible for a visit  As needed, For a follow up visit about today [address]  [facility] [id number] [phone] [facility] - Main Building, 4th Floor Please park in [facility] and take [facility] elevator             Anderson Anders MD  07/13/23 0802

## 2023-07-12 NOTE — DISCHARGE INSTRUCTIONS
Mrs. Crhistopher,    Thank you for letting me care for you today! It was nice meeting you, and I hope you feel better soon.   If you would like access to your chart and what was done today please utilize the [facility].   Please come back to [facility] for all of your future medical needs.    Our goal in the emergency department is to always give you outstanding care and exceptional service. You may receive a survey by mail or e-mail in the next week regarding your experience in our ED. We would greatly appreciate you completing and returning the survey. Your feedback provides us with a way to recognize our staff who give very good care and it helps us learn how to improve when your experience was below our aspiration of excellence.     Sincerely,    Anderson Anders MD  Board Certified Emergency Physician

## 2023-08-22 ENCOUNTER — TELEPHONE (OUTPATIENT)
Dept: GASTROENTEROLOGY | Facility: CLINIC | Age: 38
End: 2023-08-22
Payer: MEDICAID

## 2023-08-22 NOTE — PROGRESS NOTES
"    [facility] Consultation Note    Reason for Consult:  abdominal pain     PCP:   [facility]   [address] / [place] [address]    Referring MD:  Anderson Anders Md  [address] [place] [address]    HPI:  This is a 37 y.o. female here for evaluation of abdominal pain. No significant PMHx. She states that she has had lower abdominal cramping pain for last several years. Prone to constipation with BM occurring once per week. She takes PRN laxatives or suppositories. Approximately once a month she has episodes of diarrhea. Has not seen blood in stool. Recently, she went to ED because she was having both the lower abdominal cramping plus a new chest pain which was concerning for her. Blood work from then (7/2023) showed normocytic anemia and elevated AST/ALT. US wnl. She has been on iron for anemia for years. Thinks this was due to heavy menstrual cycles. Denied NSAID use. No Fh of CRC, gastric cancer, celiac or IBD.  Denied any reflux, N/V, dysphagia, weight loss. No new medications or supplements. No ETOH or drug use.      Objective Findings:    Vital Signs:  BP 96/61 (BP Location: Right arm, Patient Position: Sitting, BP Method: Small (Automatic))   Pulse 77   Ht 5' 1" (1.549 m)   Wt 65 kg (143 lb 4.8 oz)   BMI 27.08 kg/m²   Body mass index is 27.08 kg/m².    Physical Exam:  General Appearance: Well appearing in no acute distress  Head:   Normocephalic, without obvious abnormality  Eyes:    No scleral icterus    Lungs: CTA bilaterally in anterior and posterior fields   Heart:  Regular rate and rhythm, no murmurs heard  Abdomen: Soft, non tender, non distended with positive bowel sounds in all four quadrants.      Imaging:    US   Normal appearance of the gallbladder and biliary tree and incidental small simple cyst right kidney.       Endoscopy:    None     Assessment:    Abdominal pain  Constipation   Elevated liver enzymes   Normocytic " anemia, history of IRISH      Patient with years of lower abdominal cramping pain and constipation in setting of IRISH on Fe supplementation. Plan for EGD and cscope. Check celiac, CRP. Due to elevated liver enzymes, repeat today and check hep panel. Refer to hepatology. No new suspect medications.     Recommendations:    - Iron panel, celiac, CRP, Calpro   - EGD and cscope for IRISH and GI symptoms   - Referral to hepatology   - Hepatitis panel and repeat CMP today   - Start fiber and miralax bowel regimen     RTC after endoscopy     Order summary:  Orders Placed This Encounter    IRON AND TIBC    FERRITIN    COMPREHENSIVE METABOLIC PANEL    HEPATITIS PANEL, ACUTE    Celiac Disease Panel    C-reactive protein    Ambulatory referral/consult to Hepatology         Thank you so much for allowing me to participate in the care of Jane Briseno MD  Gastroenterology   [facility]

## 2023-08-23 ENCOUNTER — OFFICE VISIT (OUTPATIENT)
Dept: GASTROENTEROLOGY | Facility: CLINIC | Age: 38
End: 2023-08-23
Payer: MEDICAID

## 2023-08-23 VITALS
WEIGHT: 143.31 LBS | SYSTOLIC BLOOD PRESSURE: 96 MMHG | HEIGHT: 61 IN | HEART RATE: 77 BPM | BODY MASS INDEX: 27.06 KG/M2 | DIASTOLIC BLOOD PRESSURE: 61 MMHG

## 2023-08-23 DIAGNOSIS — R74.8 ELEVATED LIVER ENZYMES: Primary | ICD-10-CM

## 2023-08-23 DIAGNOSIS — R10.30 LOWER ABDOMINAL PAIN: ICD-10-CM

## 2023-08-23 PROCEDURE — 99204 PR OFFICE/OUTPT VISIT, NEW, LEVL IV, 45-59 MIN: ICD-10-PCS | Mod: S$PBB,,, | Performed by: STUDENT IN AN ORGANIZED HEALTH CARE EDUCATION/TRAINING PROGRAM

## 2023-08-23 PROCEDURE — 3078F PR MOST RECENT DIASTOLIC BLOOD PRESSURE < 80 MM HG: ICD-10-PCS | Mod: CPTII,,, | Performed by: STUDENT IN AN ORGANIZED HEALTH CARE EDUCATION/TRAINING PROGRAM

## 2023-08-23 PROCEDURE — 99213 OFFICE O/P EST LOW 20 MIN: CPT | Mod: PBBFAC | Performed by: STUDENT IN AN ORGANIZED HEALTH CARE EDUCATION/TRAINING PROGRAM

## 2023-08-23 PROCEDURE — 99999 PR PBB SHADOW E&M-EST. PATIENT-LVL III: CPT | Mod: PBBFAC,,, | Performed by: STUDENT IN AN ORGANIZED HEALTH CARE EDUCATION/TRAINING PROGRAM

## 2023-08-23 PROCEDURE — 3074F PR MOST RECENT SYSTOLIC BLOOD PRESSURE < 130 MM HG: ICD-10-PCS | Mod: CPTII,,, | Performed by: STUDENT IN AN ORGANIZED HEALTH CARE EDUCATION/TRAINING PROGRAM

## 2023-08-23 PROCEDURE — 3078F DIAST BP <80 MM HG: CPT | Mod: CPTII,,, | Performed by: STUDENT IN AN ORGANIZED HEALTH CARE EDUCATION/TRAINING PROGRAM

## 2023-08-23 PROCEDURE — 99204 OFFICE O/P NEW MOD 45 MIN: CPT | Mod: S$PBB,,, | Performed by: STUDENT IN AN ORGANIZED HEALTH CARE EDUCATION/TRAINING PROGRAM

## 2023-08-23 PROCEDURE — 3008F BODY MASS INDEX DOCD: CPT | Mod: CPTII,,, | Performed by: STUDENT IN AN ORGANIZED HEALTH CARE EDUCATION/TRAINING PROGRAM

## 2023-08-23 PROCEDURE — 99999 PR PBB SHADOW E&M-EST. PATIENT-LVL III: ICD-10-PCS | Mod: PBBFAC,,, | Performed by: STUDENT IN AN ORGANIZED HEALTH CARE EDUCATION/TRAINING PROGRAM

## 2023-08-23 PROCEDURE — 3074F SYST BP LT 130 MM HG: CPT | Mod: CPTII,,, | Performed by: STUDENT IN AN ORGANIZED HEALTH CARE EDUCATION/TRAINING PROGRAM

## 2023-08-23 PROCEDURE — 3008F PR BODY MASS INDEX (BMI) DOCUMENTED: ICD-10-PCS | Mod: CPTII,,, | Performed by: STUDENT IN AN ORGANIZED HEALTH CARE EDUCATION/TRAINING PROGRAM

## 2023-08-23 NOTE — PATIENT INSTRUCTIONS
Start daily fiber.  Take 1 tsp of fiber powder (psyllium or other sugar-free powder).  Mix in 8 oz of water.  Take x 3-5 days.  Then, increase fiber by 1 tsp every 3-5 days until stool is easy to pass.  Stop and continue at that dose.   Do not exceed 6 tsps/day. GOAL:  More well-formed stool (one continuous well-formed piece vs. Pellets) and minimize straining with initiation.    Daily miralax 1-2 times daily

## 2023-08-24 ENCOUNTER — TELEPHONE (OUTPATIENT)
Dept: ENDOSCOPY | Facility: HOSPITAL | Age: 38
End: 2023-08-24
Payer: MEDICAID

## 2023-08-24 ENCOUNTER — PATIENT MESSAGE (OUTPATIENT)
Dept: ENDOSCOPY | Facility: HOSPITAL | Age: 38
End: 2023-08-24
Payer: MEDICAID

## 2023-08-24 NOTE — TELEPHONE ENCOUNTER
"   Message  Received: Yesterday  Angelica Briseno MD Piglia, Ashley, RN  Caller: Unspecified (Yesterday,  2:22 PM)  Procedure: EGD/Colonoscopy     Diagnosis: Constipation, Abdominal pain, and Iron deficiency anemia     Procedure Timin-4 weeks     *If within 4 weeks selected, please anmol as high priority*     *If greater than 12 weeks, please select "5-12 weeks" and delay sending until 2 months prior to requested date*     Provider: Myself     Location: [facility]     Additional Scheduling Information: No scheduling concerns     Prep Specifications:Extended/Constipation prep     Is the patient taking a GLP-1 Agonist:no     Have you attached a patient to this message: yes    "

## 2023-08-31 ENCOUNTER — TELEPHONE (OUTPATIENT)
Dept: ENDOSCOPY | Facility: HOSPITAL | Age: 38
End: 2023-08-31
Payer: MEDICAID

## 2023-09-12 ENCOUNTER — PATIENT MESSAGE (OUTPATIENT)
Dept: ENDOSCOPY | Facility: HOSPITAL | Age: 38
End: 2023-09-12
Payer: MEDICAID

## 2023-09-12 ENCOUNTER — TELEPHONE (OUTPATIENT)
Dept: ENDOSCOPY | Facility: HOSPITAL | Age: 38
End: 2023-09-12
Payer: MEDICAID

## 2023-09-19 ENCOUNTER — TELEPHONE (OUTPATIENT)
Dept: ENDOSCOPY | Facility: HOSPITAL | Age: 38
End: 2023-09-19
Payer: MEDICAID

## 2023-09-19 ENCOUNTER — PATIENT MESSAGE (OUTPATIENT)
Dept: ENDOSCOPY | Facility: HOSPITAL | Age: 38
End: 2023-09-19
Payer: MEDICAID

## 2023-09-19 NOTE — TELEPHONE ENCOUNTER
Attempted to schedule pt multiple times with Dr. Barron. Unable to reach pt.MD notified. Order canceled at this time.

## 2023-10-24 ENCOUNTER — OFFICE VISIT (OUTPATIENT)
Dept: HEPATOLOGY | Facility: CLINIC | Age: 38
End: 2023-10-24
Payer: MEDICAID

## 2023-10-24 ENCOUNTER — LAB VISIT (OUTPATIENT)
Dept: LAB | Facility: HOSPITAL | Age: 38
End: 2023-10-24
Payer: MEDICAID

## 2023-10-24 VITALS — BODY MASS INDEX: 26.51 KG/M2 | WEIGHT: 140.44 LBS | HEIGHT: 61 IN

## 2023-10-24 DIAGNOSIS — R74.8 ELEVATED LIVER ENZYMES: Primary | ICD-10-CM

## 2023-10-24 DIAGNOSIS — R74.8 ELEVATED LIVER ENZYMES: ICD-10-CM

## 2023-10-24 PROBLEM — Z37.9 NORMAL LABOR: Status: ACTIVE | Noted: 2020-10-04

## 2023-10-24 PROBLEM — O32.2XX0 TRANSVERSE LIE OF FETUS: Status: ACTIVE | Noted: 2018-06-25

## 2023-10-24 PROBLEM — O14.10 PREECLAMPSIA, SEVERE: Status: ACTIVE | Noted: 2018-08-01

## 2023-10-24 LAB
ALBUMIN SERPL BCP-MCNC: 3.8 G/DL (ref 3.5–5.2)
ALP SERPL-CCNC: 49 U/L (ref 55–135)
ALT SERPL W/O P-5'-P-CCNC: 6 U/L (ref 10–44)
AST SERPL-CCNC: 13 U/L (ref 10–40)
BILIRUB DIRECT SERPL-MCNC: 0.2 MG/DL (ref 0.1–0.3)
BILIRUB SERPL-MCNC: 0.4 MG/DL (ref 0.1–1)
HAV IGG SER QL IA: NORMAL
HBV CORE AB SERPL QL IA: NORMAL
HBV SURFACE AG SERPL QL IA: NORMAL
HCV AB SERPL QL IA: NORMAL
PROT SERPL-MCNC: 7.1 G/DL (ref 6–8.4)

## 2023-10-24 PROCEDURE — 99203 OFFICE O/P NEW LOW 30 MIN: CPT | Mod: S$PBB,,, | Performed by: NURSE PRACTITIONER

## 2023-10-24 PROCEDURE — 99214 OFFICE O/P EST MOD 30 MIN: CPT | Mod: PBBFAC | Performed by: NURSE PRACTITIONER

## 2023-10-24 PROCEDURE — 3008F PR BODY MASS INDEX (BMI) DOCUMENTED: ICD-10-PCS | Mod: CPTII,,, | Performed by: NURSE PRACTITIONER

## 2023-10-24 PROCEDURE — 80076 HEPATIC FUNCTION PANEL: CPT | Performed by: NURSE PRACTITIONER

## 2023-10-24 PROCEDURE — 86790 VIRUS ANTIBODY NOS: CPT | Performed by: NURSE PRACTITIONER

## 2023-10-24 PROCEDURE — 1160F RVW MEDS BY RX/DR IN RCRD: CPT | Mod: CPTII,,, | Performed by: NURSE PRACTITIONER

## 2023-10-24 PROCEDURE — 99203 PR OFFICE/OUTPT VISIT, NEW, LEVL III, 30-44 MIN: ICD-10-PCS | Mod: S$PBB,,, | Performed by: NURSE PRACTITIONER

## 2023-10-24 PROCEDURE — 86706 HEP B SURFACE ANTIBODY: CPT | Performed by: NURSE PRACTITIONER

## 2023-10-24 PROCEDURE — 1159F PR MEDICATION LIST DOCUMENTED IN MEDICAL RECORD: ICD-10-PCS | Mod: CPTII,,, | Performed by: NURSE PRACTITIONER

## 2023-10-24 PROCEDURE — 86704 HEP B CORE ANTIBODY TOTAL: CPT | Performed by: NURSE PRACTITIONER

## 2023-10-24 PROCEDURE — 1160F PR REVIEW ALL MEDS BY PRESCRIBER/CLIN PHARMACIST DOCUMENTED: ICD-10-PCS | Mod: CPTII,,, | Performed by: NURSE PRACTITIONER

## 2023-10-24 PROCEDURE — 87340 HEPATITIS B SURFACE AG IA: CPT | Performed by: NURSE PRACTITIONER

## 2023-10-24 PROCEDURE — 99999 PR PBB SHADOW E&M-EST. PATIENT-LVL IV: ICD-10-PCS | Mod: PBBFAC,,, | Performed by: NURSE PRACTITIONER

## 2023-10-24 PROCEDURE — 99999 PR PBB SHADOW E&M-EST. PATIENT-LVL IV: CPT | Mod: PBBFAC,,, | Performed by: NURSE PRACTITIONER

## 2023-10-24 PROCEDURE — 36415 COLL VENOUS BLD VENIPUNCTURE: CPT | Performed by: NURSE PRACTITIONER

## 2023-10-24 PROCEDURE — 1159F MED LIST DOCD IN RCRD: CPT | Mod: CPTII,,, | Performed by: NURSE PRACTITIONER

## 2023-10-24 PROCEDURE — 86803 HEPATITIS C AB TEST: CPT | Performed by: NURSE PRACTITIONER

## 2023-10-24 PROCEDURE — 3008F BODY MASS INDEX DOCD: CPT | Mod: CPTII,,, | Performed by: NURSE PRACTITIONER

## 2023-10-24 RX ORDER — FERROUS SULFATE TAB 325 MG (65 MG ELEMENTAL FE) 325 (65 FE) MG
TAB ORAL
COMMUNITY
Start: 2023-08-17

## 2023-10-24 RX ORDER — METRONIDAZOLE 500 MG/1
500 TABLET ORAL 2 TIMES DAILY
COMMUNITY
Start: 2023-05-10 | End: 2023-10-24

## 2023-10-24 RX ORDER — IBUPROFEN 200 MG
200 TABLET ORAL EVERY 6 HOURS PRN
COMMUNITY

## 2023-10-24 NOTE — PROGRESS NOTES
[facility] New Patient Visit    Reason for Visit: Elevated Liver Enzymes    PCP: [facility]    HPI:  This is a 38 y.o. female with PMH noted below, here for evaluation of elevated liver enzymes, referred by GI.    The patient's risk factors for fatty liver disease include:     Obesity                                        No; BMI 26.53  Dyslipidemia                                No; No lipid panel on file  Insulin resistance/Diabetes         No; No HgbA1c on file  Family history of diabetes           Yes    She had an isolated episode of elevated liver enzymes in a hepatocellular pattern in 7/2023, noted during ED visit for chest pain/abdominal pain with vomiting. Prior LFT's in 10/2020 were normal.      Latest Reference Range & Units 10/05/20 05:41 07/12/23 11:27   Alkaline Phosphatase 46 - 116 U/L 98 (E)    ALP 55 - 135 U/L  48 (L)   PROTEIN TOTAL 6.0 - 8.4 g/dL 5.5 (L) (E) 6.9   Albumin 3.5 - 5.2 g/dL 3.3 (E) 3.7   BILIRUBIN TOTAL 0.1 - 1.0 mg/dL 0.5 (E) 0.3   AST 10 - 40 U/L 23 (E) 446 (H)   ALT 10 - 44 U/L <7 (L) (E) 226 (H)     Limited abdominal ultrasound in 7/2023 showed:    CLINICAL HISTORY:  elevated lfts     FINDINGS:    The pancreas demonstrates no abnormality.  The gallbladder demonstrates no stone, wall thickening, Allen sign and the bile duct measures 3 mm there is a right renal lower pole cyst measuring 0.8 cm.     Impression:     Normal appearance of the gallbladder and biliary tree and incidental small simple cyst right kidney.    She has never undergone a liver biopsy or non-invasive staging exam.     FIB-4 Calculation: 0 at 10/24/2023 10:15 AM   Calculated from:  Last SGOT/AST : 446 at 10/24/2023 10:15 AM  Last SGPT/ALT: 226 at 10/24/2023 10:15 AM   Platelets: 247 at 10/24/2023 10:15 AM   Age: 38 y.o.     FIB-4 below 1.30 is considered as low-risk for advanced fibrosis  FIB-4 over 2.67 is considered as high-risk for advanced fibrosis  FIB-4 values between  1.30 and 2.67 are considered as intermediate-risk of advanced fibrosis for ages 36-64.     For ages > 64 the cut-off for low-risk goes to < 2.  This is a screening tool and clinical judgement should be used in the interpretation of these results.    She has no known family history of liver disease. She denies any history of heavy alcohol use. She does not use illicit drugs. HBV and HIV negative on prior labs. She has received 3 vaccinations for Hepatitis B. She denies use of herbal supplements/alternative remedies. She denies daily use of OTC pain medications (Acetaminophen or Ibuprofen). She is well appearing, and has no signs or symptoms of hepatic decompensation including jaundice, dark urine, pruritus, abdominal distention, lower extremity edema, hematemesis, melena, or periods of confusion suggestive of hepatic encephalopathy.      PMHX:  has a past medical history of Preeclampsia, severe (2018).    PSHX:  has a past surgical history that includes  section.    The patient's social and family histories were reviewed by me and updated in the appropriate section of the electronic medical record.    Review of patient's allergies indicates:   Allergen Reactions    Iodinated contrast media      Itchy (skin)^    Iodine and iodide containing products     Latex, natural rubber     Shellfish containing products      Itchy (skin)^    Latex Itching     Current Outpatient Medications on File Prior to Visit   Medication Sig Dispense Refill    FEROSUL 325 mg (65 mg iron) Tab tablet Take by mouth.      ibuprofen (ADVIL,MOTRIN) 200 MG tablet Take 200 mg by mouth every 6 (six) hours as needed.      [DISCONTINUED] metroNIDAZOLE (FLAGYL) 500 MG tablet Take 500 mg by mouth 2 (two) times daily.      [DISCONTINUED] prenatal vit-iron fum-folic ac 65 mg iron- 1 mg Tab Take by mouth.       No current facility-administered medications on file prior to visit.     SOCIAL HISTORY:   Social History     Tobacco Use   Smoking  "Status Never   Smokeless Tobacco Never     Social History     Substance and Sexual Activity   Alcohol Use No     Social History     Substance and Sexual Activity   Drug Use No     ROS:   GENERAL: Denies fever, chills, weight loss/gain, fatigue  HEENT: Denies headaches, dizziness, vision/hearing changes  CARDIOVASCULAR: Denies chest pain, palpitations, or edema  RESPIRATORY: Denies dyspnea, cough  GI: Denies abdominal pain, rectal bleeding, nausea, vomiting. No change in bowel pattern or color  : Denies dysuria, hematuria   SKIN: Denies rash, itching   NEURO: Denies confusion, memory loss, or mood changes  PSYCH: Denies depression or anxiety  HEME/LYMPH: Denies easy bruising or bleeding    Objective Findings:    PHYSICAL EXAM:   Friendly Black or  female, in no acute distress; alert and oriented to person, place and time.  VITALS: Ht 5' 1" (1.549 m)   Wt 63.7 kg (140 lb 6.9 oz)   BMI 26.53 kg/m²   HENT: Normocephalic, without obvious abnormality.  EYES: Sclerae anicteric.   NECK: No obvious masses.  CARDIOVASCULAR: No peripheral edema.  RESPIRATORY: Normal respiratory effort.  GI: Non-distended abdomen.   EXTREMITIES:  No clubbing, cyanosis or edema.  SKIN: Warm and dry. No jaundice. No rashes noted to exposed skin.   NEURO:  Normal gait. No asterixis.  PSYCH:  Memory intact. Thought and speech pattern appropriate. Behavior normal. No depression or anxiety noted.    DIAGNOSTIC STUDIES:    US ABDOMEN LIMITED 7/12/2023:    FINDINGS:    The pancreas demonstrates no abnormality.  The gallbladder demonstrates no stone, wall thickening, Allen sign and the bile duct measures 3 mm there is a right renal lower pole cyst measuring 0.8 cm.     Impression:     Normal appearance of the gallbladder and biliary tree and incidental small simple cyst right kidney.     ASSESSMENT & PLAN:  38 y.o. Black or  female with:    1. Elevated liver enzymes  Ambulatory referral/consult to Hepatology    " Hepatic Function Panel    US Abdomen Complete    Hepatitis A antibody, IgG    Hepatitis B Core Antibody, Total    Hepatitis B Surface Antibody, Qual/Quant    Hepatitis B Surface Antigen    Hepatitis C Antibody    CANCELED: FibroScan [place] (Vibration Controlled Transient Elastography)        - Schedule Fibroscan to stage liver disease once liver enzymes improve/normalize.  - Schedule Complete Abdominal Ultrasound now.  - Repeat liver function tests today.   - Will also screen for viral hepatitis.   - Maintain a healthy weight, through diet and exercise.  - Recommend good control of cholesterol, blood pressure, & blood sugar levels.  - Avoid alcohol and herbal supplements/alternative remedies.    Follow up in about 4 weeks (around 11/21/2023). with labs and US before visit.    Thank you for allowing me to participate in the care of Jane Christopher       Hepatology Nurse Practitioner  [facility]    CC'ed note to:   Angelica Briseno MD  [place], [facility]

## 2023-10-27 LAB
HBV SURFACE AB SER QL IA: POSITIVE
HBV SURFACE AB SERPL IA-ACNC: 652 MIU/ML

## 2024-01-29 PROBLEM — Z37.9 NORMAL LABOR: Status: RESOLVED | Noted: 2020-10-04 | Resolved: 2024-01-29
